# Patient Record
Sex: FEMALE | Race: ASIAN | NOT HISPANIC OR LATINO | URBAN - METROPOLITAN AREA
[De-identification: names, ages, dates, MRNs, and addresses within clinical notes are randomized per-mention and may not be internally consistent; named-entity substitution may affect disease eponyms.]

---

## 2017-05-08 ENCOUNTER — COMMUNICATION - HEALTHEAST (OUTPATIENT)
Dept: FAMILY MEDICINE | Facility: CLINIC | Age: 22
End: 2017-05-08

## 2017-05-18 ENCOUNTER — OFFICE VISIT - HEALTHEAST (OUTPATIENT)
Dept: FAMILY MEDICINE | Facility: CLINIC | Age: 22
End: 2017-05-18

## 2017-05-18 DIAGNOSIS — R21 RASH: ICD-10-CM

## 2017-05-18 DIAGNOSIS — K21.9 GERD (GASTROESOPHAGEAL REFLUX DISEASE): ICD-10-CM

## 2017-05-18 ASSESSMENT — MIFFLIN-ST. JEOR: SCORE: 1496.58

## 2021-02-25 ENCOUNTER — APPOINTMENT (OUTPATIENT)
Dept: OCCUPATIONAL MEDICINE | Age: 26
End: 2021-02-25

## 2021-02-26 ENCOUNTER — OCC HEALTH (OUTPATIENT)
Dept: OCCUPATIONAL MEDICINE | Age: 26
End: 2021-02-26

## 2021-02-26 VITALS
HEIGHT: 63 IN | WEIGHT: 197 LBS | HEART RATE: 74 BPM | DIASTOLIC BLOOD PRESSURE: 72 MMHG | BODY MASS INDEX: 34.91 KG/M2 | SYSTOLIC BLOOD PRESSURE: 114 MMHG

## 2021-02-26 DIAGNOSIS — Z02.89 ENCOUNTER FOR OCCUPATIONAL HEALTH EXAMINATION: ICD-10-CM

## 2021-02-26 DIAGNOSIS — Z11.1 SCREENING FOR TUBERCULOSIS: ICD-10-CM

## 2021-02-26 DIAGNOSIS — Z02.89 VISIT FOR OCCUPATIONAL HEALTH EXAMINATION: Primary | ICD-10-CM

## 2021-02-26 PROCEDURE — 86580 TB INTRADERMAL TEST: CPT | Performed by: PHYSICIAN ASSISTANT

## 2021-02-26 PROCEDURE — OH021 PRE PLACEMENT PHYSICAL EXAM: Performed by: PHYSICIAN ASSISTANT

## 2021-03-01 ENCOUNTER — OFFICE VISIT (OUTPATIENT)
Dept: OCCUPATIONAL MEDICINE | Age: 26
End: 2021-03-01

## 2021-03-01 ENCOUNTER — APPOINTMENT (OUTPATIENT)
Dept: OCCUPATIONAL MEDICINE | Age: 26
End: 2021-03-01

## 2021-03-01 DIAGNOSIS — Z00.8 HEALTH EXAMINATION IN POPULATION SURVEY: Primary | ICD-10-CM

## 2021-03-01 LAB
INDURATION: 0 MM (ref 0–?)
SKIN TEST RESULT: NEGATIVE

## 2021-05-31 VITALS — BODY MASS INDEX: 32.71 KG/M2 | HEIGHT: 62 IN | WEIGHT: 177.75 LBS

## 2021-06-10 NOTE — PROGRESS NOTES
"OFFICE VISIT NOTE      Assessment & Plan   Ankita Guerrero is a 22 y.o. female with a slowly resolving eczema rash on her left hand (slow due to irregular medication applications); I encouraged her to be more regular with treatment BUT ALSO to take a break for a week after another week of treatment.  I also encouraged her to take Tums or her famotidine prn GERD.      Diagnoses and all orders for this visit:    Rash    GERD (gastroesophageal reflux disease)      Julia Elizalde MD              Subjective:   Chief Complaint:  Follow-up    22 y.o. female.     1) Ankita is here in follow up about her hand rash. It is improving, but she readily admits she's not so good about putting the medicated ointment on it. She puts it on especially if it is itchy, but otherwise, she forgets a lot. She knows it is not healed and that she neglects it.    2) her GERD is OK --she feels it if she eats quickly; she then slows down or stops eating for a while and it gets better; she has not taken medication for it.    Current Outpatient Prescriptions   Medication Sig     clobetasol (TEMOVATE) 0.05 % ointment Apply sparingly to severely affected area twice daily     famotidine (PEPCID) 20 MG tablet Take 1 tablet (20 mg total) by mouth 2 (two) times a day.     triamcinolone (KENALOG) 0.1 % cream Apply to itchy areas four times daily as needed       PSFHx: appropriate sections of PMH completed/filled in   Tobacco Status:  She  reports that she has quit smoking. She started smoking about 14 months ago. She does not have any smokeless tobacco history on file.    Review of Systems:  No fever.  No diarrhea.    Objective:    /76  Pulse 68  Temp 98.4  F (36.9  C) (Oral)   Resp 16  Ht 5' 1.5\" (1.562 m)  Wt 177 lb 12 oz (80.6 kg)  LMP 04/27/2017 (Exact Date)  Breastfeeding? No  BMI 33.04 kg/m2  GENERAL: No acute distress.  Skin: left hand lesion is less thick and some of the affected skin is even returning to normal color, but other " areas of the lesion were dry and cracked (3 separate areas); palmar surface is clear    Greater than 25 minutes spent with patient, with greater than 50% of time spent in counseling, consultation and care coordination regarding problems detailed above.

## 2021-10-11 ENCOUNTER — TELEPHONE (OUTPATIENT)
Dept: SCHEDULING | Age: 26
End: 2021-10-11

## 2023-03-22 ENCOUNTER — WALK IN (OUTPATIENT)
Dept: URGENT CARE | Age: 28
End: 2023-03-22

## 2023-03-22 VITALS
TEMPERATURE: 97.9 F | BODY MASS INDEX: 33.83 KG/M2 | DIASTOLIC BLOOD PRESSURE: 80 MMHG | SYSTOLIC BLOOD PRESSURE: 116 MMHG | HEART RATE: 96 BPM | WEIGHT: 191 LBS | OXYGEN SATURATION: 97 %

## 2023-03-22 DIAGNOSIS — R11.10 VOMITING AND DIARRHEA: Primary | ICD-10-CM

## 2023-03-22 DIAGNOSIS — R19.7 VOMITING AND DIARRHEA: Primary | ICD-10-CM

## 2023-03-22 PROCEDURE — 99202 OFFICE O/P NEW SF 15 MIN: CPT | Performed by: FAMILY MEDICINE

## 2023-10-20 ENCOUNTER — WALK IN (OUTPATIENT)
Dept: URGENT CARE | Age: 28
End: 2023-10-20

## 2023-10-20 VITALS
TEMPERATURE: 101.1 F | SYSTOLIC BLOOD PRESSURE: 120 MMHG | RESPIRATION RATE: 18 BRPM | HEART RATE: 114 BPM | OXYGEN SATURATION: 97 % | DIASTOLIC BLOOD PRESSURE: 81 MMHG

## 2023-10-20 DIAGNOSIS — B96.89 BACTERIAL TONSILLITIS: Primary | ICD-10-CM

## 2023-10-20 DIAGNOSIS — J03.80 BACTERIAL TONSILLITIS: Primary | ICD-10-CM

## 2023-10-20 LAB
S PYO DNA THROAT QL NAA+PROBE: NOT DETECTED
TEST LOT EXPIRATION DATE: NORMAL
TEST LOT NUMBER: NORMAL

## 2023-10-20 PROCEDURE — 99204 OFFICE O/P NEW MOD 45 MIN: CPT | Performed by: EMERGENCY MEDICINE

## 2023-10-20 PROCEDURE — 87651 STREP A DNA AMP PROBE: CPT | Performed by: EMERGENCY MEDICINE

## 2023-10-20 RX ORDER — AMOXICILLIN 500 MG/1
500 CAPSULE ORAL 2 TIMES DAILY
Qty: 20 CAPSULE | Refills: 0 | Status: SHIPPED | OUTPATIENT
Start: 2023-10-20 | End: 2023-10-30

## 2024-01-04 ENCOUNTER — WALK IN (OUTPATIENT)
Dept: URGENT CARE | Age: 29
End: 2024-01-04

## 2024-01-04 VITALS
TEMPERATURE: 97.5 F | SYSTOLIC BLOOD PRESSURE: 132 MMHG | OXYGEN SATURATION: 99 % | DIASTOLIC BLOOD PRESSURE: 90 MMHG | HEART RATE: 99 BPM

## 2024-01-04 DIAGNOSIS — U07.1 COVID-19 VIRUS INFECTION: Primary | ICD-10-CM

## 2024-01-04 PROCEDURE — 99213 OFFICE O/P EST LOW 20 MIN: CPT

## 2024-01-04 ASSESSMENT — ENCOUNTER SYMPTOMS
COUGH: 1
SHORTNESS OF BREATH: 0
CONSTIPATION: 0
ABDOMINAL DISTENTION: 0
CHEST TIGHTNESS: 0
ABDOMINAL PAIN: 0
ANAL BLEEDING: 0
DIARRHEA: 1
BLOOD IN STOOL: 0
HEADACHES: 0
DIZZINESS: 0
WEAKNESS: 0
RHINORRHEA: 1

## 2024-02-09 ENCOUNTER — WALK IN (OUTPATIENT)
Dept: URGENT CARE | Age: 29
End: 2024-02-09

## 2024-02-09 VITALS
RESPIRATION RATE: 16 BRPM | SYSTOLIC BLOOD PRESSURE: 132 MMHG | WEIGHT: 195 LBS | BODY MASS INDEX: 34.54 KG/M2 | TEMPERATURE: 97 F | DIASTOLIC BLOOD PRESSURE: 85 MMHG | OXYGEN SATURATION: 98 % | HEART RATE: 101 BPM

## 2024-02-09 DIAGNOSIS — R19.7 DIARRHEA, UNSPECIFIED TYPE: Primary | ICD-10-CM
